# Patient Record
Sex: FEMALE | Race: WHITE
[De-identification: names, ages, dates, MRNs, and addresses within clinical notes are randomized per-mention and may not be internally consistent; named-entity substitution may affect disease eponyms.]

---

## 2018-03-17 ENCOUNTER — HOSPITAL ENCOUNTER (EMERGENCY)
Dept: HOSPITAL 17 - NEPC | Age: 28
LOS: 1 days | Discharge: HOME | End: 2018-03-18
Payer: COMMERCIAL

## 2018-03-17 VITALS — OXYGEN SATURATION: 100 % | RESPIRATION RATE: 14 BRPM

## 2018-03-17 VITALS
OXYGEN SATURATION: 100 % | RESPIRATION RATE: 16 BRPM | SYSTOLIC BLOOD PRESSURE: 102 MMHG | HEART RATE: 87 BPM | DIASTOLIC BLOOD PRESSURE: 54 MMHG | TEMPERATURE: 97.5 F

## 2018-03-17 DIAGNOSIS — R94.31: ICD-10-CM

## 2018-03-17 DIAGNOSIS — R55: Primary | ICD-10-CM

## 2018-03-17 DIAGNOSIS — I49.8: ICD-10-CM

## 2018-03-17 DIAGNOSIS — Z88.2: ICD-10-CM

## 2018-03-17 PROCEDURE — 84484 ASSAY OF TROPONIN QUANT: CPT

## 2018-03-17 PROCEDURE — 96361 HYDRATE IV INFUSION ADD-ON: CPT

## 2018-03-17 PROCEDURE — 96360 HYDRATION IV INFUSION INIT: CPT

## 2018-03-17 PROCEDURE — 85025 COMPLETE CBC W/AUTO DIFF WBC: CPT

## 2018-03-17 PROCEDURE — 80053 COMPREHEN METABOLIC PANEL: CPT

## 2018-03-17 PROCEDURE — 81001 URINALYSIS AUTO W/SCOPE: CPT

## 2018-03-17 PROCEDURE — 83735 ASSAY OF MAGNESIUM: CPT

## 2018-03-17 PROCEDURE — 93005 ELECTROCARDIOGRAM TRACING: CPT

## 2018-03-17 PROCEDURE — 82550 ASSAY OF CK (CPK): CPT

## 2018-03-17 PROCEDURE — 84703 CHORIONIC GONADOTROPIN ASSAY: CPT

## 2018-03-17 PROCEDURE — 99285 EMERGENCY DEPT VISIT HI MDM: CPT

## 2018-03-17 PROCEDURE — 71045 X-RAY EXAM CHEST 1 VIEW: CPT

## 2018-03-18 LAB
ALBUMIN SERPL-MCNC: 3.5 GM/DL (ref 3.4–5)
ALP SERPL-CCNC: 80 U/L (ref 45–117)
ALT SERPL-CCNC: 15 U/L (ref 10–53)
AST SERPL-CCNC: 18 U/L (ref 15–37)
BASOPHILS # BLD AUTO: 0.1 TH/MM3 (ref 0–0.2)
BASOPHILS NFR BLD: 0.7 % (ref 0–2)
BILIRUB SERPL-MCNC: 0.3 MG/DL (ref 0.2–1)
BUN SERPL-MCNC: 9 MG/DL (ref 7–18)
CALCIUM SERPL-MCNC: 8.6 MG/DL (ref 8.5–10.1)
CHLORIDE SERPL-SCNC: 106 MEQ/L (ref 98–107)
COLOR UR: YELLOW
CREAT SERPL-MCNC: 0.98 MG/DL (ref 0.5–1)
EOSINOPHIL # BLD: 0.2 TH/MM3 (ref 0–0.4)
EOSINOPHIL NFR BLD: 2.5 % (ref 0–4)
ERYTHROCYTE [DISTWIDTH] IN BLOOD BY AUTOMATED COUNT: 13.3 % (ref 11.6–17.2)
GFR SERPLBLD BASED ON 1.73 SQ M-ARVRAT: 67 ML/MIN (ref 89–?)
GLUCOSE SERPL-MCNC: 92 MG/DL (ref 74–106)
GLUCOSE UR STRIP-MCNC: (no result) MG/DL
HCO3 BLD-SCNC: 19.7 MEQ/L (ref 21–32)
HCT VFR BLD CALC: 42 % (ref 35–46)
HGB BLD-MCNC: 14.4 GM/DL (ref 11.6–15.3)
HGB UR QL STRIP: (no result)
KETONES UR STRIP-MCNC: (no result) MG/DL
LYMPHOCYTES # BLD AUTO: 1.6 TH/MM3 (ref 1–4.8)
LYMPHOCYTES NFR BLD AUTO: 18.4 % (ref 9–44)
MAGNESIUM SERPL-MCNC: 1.8 MG/DL (ref 1.5–2.5)
MCH RBC QN AUTO: 30.3 PG (ref 27–34)
MCHC RBC AUTO-ENTMCNC: 34.3 % (ref 32–36)
MCV RBC AUTO: 88.3 FL (ref 80–100)
MONOCYTE #: 0.6 TH/MM3 (ref 0–0.9)
MONOCYTES NFR BLD: 6.8 % (ref 0–8)
MUCOUS THREADS #/AREA URNS LPF: (no result) /LPF
NEUTROPHILS # BLD AUTO: 6.3 TH/MM3 (ref 1.8–7.7)
NEUTROPHILS NFR BLD AUTO: 71.6 % (ref 16–70)
NITRITE UR QL STRIP: (no result)
PLATELET # BLD: 209 TH/MM3 (ref 150–450)
PMV BLD AUTO: 8.5 FL (ref 7–11)
PROT SERPL-MCNC: 7.4 GM/DL (ref 6.4–8.2)
RBC # BLD AUTO: 4.75 MIL/MM3 (ref 4–5.3)
SODIUM SERPL-SCNC: 137 MEQ/L (ref 136–145)
SP GR UR STRIP: 1.01 (ref 1–1.03)
SQUAMOUS #/AREA URNS HPF: 2 /HPF (ref 0–5)
TROPONIN I SERPL-MCNC: (no result) NG/ML (ref 0.02–0.05)
URINE LEUKOCYTE ESTERASE: (no result)
WBC # BLD AUTO: 8.7 TH/MM3 (ref 4–11)

## 2018-03-18 NOTE — PD
HPI


Chief Complaint:  Syncope/Near-Syncope


Time Seen by Provider:  23:52


Travel History


International Travel<30 days:  No


Contact w/Intl Traveler<30days:  No





History of Present Illness


HPI


Patient is a 28-year-old female presents emergency department for evaluation of 

syncopal episode tonight.  Patient states she has had multiple syncopal 

episodes in the past but "never has lost consciousness before".  She is a 

company by her boyfriend who states she got very flushed just before the event 

happen and then she was passed out she was lowered to the ground and the police 

officer passed by and took her pulse and then started rescue breathing and she 

was not breathing.  After a few breaths the patient came to when asked what 

happened.  She denies any alcohol ingestion tonight.  She denies any chest pain 

shortness breath abdominal pain nausea vomiting palpitations before the event.  

She states she has been worked up for these in the past and no definitive cause 

has been isolated.  Symptoms are resolved, moderate, context and associated 

signs and symptoms as above.





[facility]


Past Medical History


Immunizations Current:  Yes


Pregnant?:  Not Pregnant





Past Surgical History


Oral Surgery:  Yes (12 TOOTH EXTRACTIONS)





Social History


Alcohol Use:  Yes ([facility])


Tobacco Use:  No


Substance Use:  No





Allergies-Medications


(Allergen,Severity, Reaction):  


Coded Allergies:  


     Sulfa (Sulfonamide Antibiotics) (Verified  Allergy, Unknown, 3/17/18)


Reported Meds & Prescriptions





Reported Meds & Active Scripts


Active


No Active Prescriptions or Reported Medications    








Review of Systems


Except as stated in HPI:  all other systems reviewed are Neg





Physical Exam


Narrative


GENERAL: Well-developed well-nourished, no obvious distress


SKIN: Focused skin assessment warm/dry.


HEAD: Atraumatic. Normocephalic. 


EYES: Pupils equal and round. No scleral icterus. No injection or drainage. 


ENT: No nasal bleeding or discharge.  Mucous membranes pink and moist.  Poor 

dentition


NECK: Trachea midline. No JVD. 


CARDIOVASCULAR: Regular rate and rhythm.  No murmur appreciated.


RESPIRATORY: No accessory muscle use. Clear to auscultation. Breath sounds 

equal bilaterally. 


GASTROINTESTINAL: Abdomen soft, non-tender, nondistended. Hepatic and splenic 

margins not palpable. 


MUSCULOSKELETAL: No obvious deformities. No clubbing.  No cyanosis.  No edema. 


NEUROLOGICAL: Awake and alert.  Cranial nerves II through XII are grossly 

intact and nonfocal, 5 out of 5 strength in all 4 extremity's


PSYCHIATRIC: Appropriate mood and affect; insight and judgment normal.





Data


Data


Last Documented VS





Vital Signs








  Date Time  Temp Pulse Resp B/P (MAP) Pulse Ox O2 Delivery O2 Flow Rate FiO2


 


3/17/18 23:55   16  100 Room Air  


 


3/17/18 23:30 97.5 87  102/54 (70)    








Orders





 Orders


Electrocardiogram (3/17/18 23:40)


Ed Urine Pregnancytest Poc (3/17/18 23:40)


Complete Blood Count With Diff (3/17/18 23:40)


Comprehensive Metabolic Panel (3/17/18 23:40)


Magnesium (Mg) (3/17/18 23:40)


Ckmb (Isoenzyme) Profile (3/17/18 23:40)


Troponin I (3/17/18 23:40)


Urinalysis - C+S If Indicated (3/17/18 23:40)


Chest, Single Ap (3/17/18 23:40)


Blood Glucose (3/17/18 23:40)


Ecg Monitoring (3/17/18 23:40)


Iv Access Insert/Monitor (3/17/18 23:40)


Oximetry (3/17/18 23:40)


Sodium Chloride 0.9% Flush (Ns Flush) (3/17/18 23:45)


Sodium Chlor 0.9% 1000 Ml Inj (Ns 1000 M (3/17/18 23:40)


Orthostatic Vital Signs (3/17/18 23:40)


Ed Discharge Order (3/18/18 03:14)





Labs





Laboratory Tests








Test


  3/17/18


23:45 3/18/18


02:10


 


White Blood Count 8.7 TH/MM3  


 


Red Blood Count 4.75 MIL/MM3  


 


Hemoglobin 14.4 GM/DL  


 


Hematocrit 42.0 %  


 


Mean Corpuscular Volume 88.3 FL  


 


Mean Corpuscular Hemoglobin 30.3 PG  


 


Mean Corpuscular Hemoglobin


Concent 34.3 % 


  


 


 


Red Cell Distribution Width 13.3 %  


 


Platelet Count 209 TH/MM3  


 


Mean Platelet Volume 8.5 FL  


 


Neutrophils (%) (Auto) 71.6 %  


 


Lymphocytes (%) (Auto) 18.4 %  


 


Monocytes (%) (Auto) 6.8 %  


 


Eosinophils (%) (Auto) 2.5 %  


 


Basophils (%) (Auto) 0.7 %  


 


Neutrophils # (Auto) 6.3 TH/MM3  


 


Lymphocytes # (Auto) 1.6 TH/MM3  


 


Monocytes # (Auto) 0.6 TH/MM3  


 


Eosinophils # (Auto) 0.2 TH/MM3  


 


Basophils # (Auto) 0.1 TH/MM3  


 


CBC Comment DIFF FINAL  


 


Differential Comment   


 


Blood Urea Nitrogen 9 MG/DL  


 


Creatinine 0.98 MG/DL  


 


Random Glucose 92 MG/DL  


 


Total Protein 7.4 GM/DL  


 


Albumin 3.5 GM/DL  


 


Calcium Level 8.6 MG/DL  


 


Magnesium Level 1.8 MG/DL  


 


Alkaline Phosphatase 80 U/L  


 


Aspartate Amino Transf


(AST/SGOT) 18 U/L 


  


 


 


Alanine Aminotransferase


(ALT/SGPT) 15 U/L 


  


 


 


Total Bilirubin 0.3 MG/DL  


 


Sodium Level 137 MEQ/L  


 


Potassium Level 3.0 MEQ/L  


 


Chloride Level 106 MEQ/L  


 


Carbon Dioxide Level 19.7 MEQ/L  


 


Anion Gap 11 MEQ/L  


 


Estimat Glomerular Filtration


Rate 67 ML/MIN 


  


 


 


Total Creatine Kinase 63 U/L  


 


Troponin I


  LESS THAN 0.02


NG/ML 


 


 


Urine Color  YELLOW 


 


Urine Turbidity  CLEAR 


 


Urine pH  6.0 


 


Urine Specific Gravity  1.010 


 


Urine Protein  NEG mg/dL 


 


Urine Glucose (UA)  NEG mg/dL 


 


Urine Ketones  NEG mg/dL 


 


Urine Occult Blood  NEG 


 


Urine Nitrite  NEG 


 


Urine Bilirubin  NEG 


 


Urine Urobilinogen


  


  LESS THAN 2.0


MG/DL


 


Urine Leukocyte Esterase  TRACE 


 


Urine RBC


  


  LESS THAN 1


/hpf


 


Urine WBC  2 /hpf 


 


Urine Squamous Epithelial


Cells 


  2 /hpf 


 


 


Urine Mucus  FEW /lpf 


 


Microscopic Urinalysis Comment


  


  CULT NOT


INDICATED











MDM


Medical Decision Making


Medical Screen Exam Complete:  Yes


Emergency Medical Condition:  Yes


Differential Diagnosis


Syncope, status post respiratory arrest, acute coronary syndrome unlikely, 

anemia.


Narrative Course


Patient was room to the emergency department, initial workup negative, observed 

for several hours in the emergency department has had no symptoms at all while 

here.  Patient is a not history stating "I passed out multiple times before but 

never lost consciousness".  This time by the symphysis presumed to be ruled the 

patient is low risk discharge and will need to follow-up with a cardiologist.  

I have referred her to one.  Holter monitoring I think would be the next 

appropriate step for her and this was discussed with her.  This time is no 

indication further workup.





Diagnosis





 Primary Impression:  


 Syncope


Referrals:  


Luis Connors MD


Scripts


No Active Prescriptions or Reported Meds











Leonardo Oakes MD Mar 18, 2018 00:58

## 2018-03-18 NOTE — EKG
Date Performed: 03/18/2018       Time Performed: 00:03:30

 

PTAGE:      28 years

 

EKG:      Sinus rhythm 

 

 WITH SINUS ARRHYTHMIA POSSIBLE RIGHT VENTRICULAR CONDUCTION DELAY NONSPECIFIC T-WAVE ABNORMALITY BOR
DERLINE ECG 

 

NO PREVIOUS TRACING            

 

DOCTOR:   Franky Lazar  Interpretating Date/Time  03/18/2018 12:57:40

## 2018-03-18 NOTE — RADRPT
EXAM DATE/TIME:  03/17/2018 23:48 

 

[facility] COMPARISON:     

No previous studies available for comparison.

 

                     

INDICATIONS :     

Syncope

                     

 

MEDICAL HISTORY :     

None.          

 

SURGICAL HISTORY :     

None.   

 

ENCOUNTER:     

Initial                                        

 

ACUITY:     

1 day      

 

PAIN SCORE:     

0/10

 

LOCATION:     

Bilateral chest 

 

FINDINGS:     

A single view of the chest demonstrates the lungs to be symmetrically aerated without evidence of mas
s, infiltrate or effusion.  The cardiomediastinal contours are unremarkable.  Osseous structures are 
intact.

 

CONCLUSION:     

No acute cardiopulmonary process.

 

 

 

 Hiram Berg MD on March 18, 2018 at 0:15           

Board Certified Radiologist.

 This report was verified electronically.